# Patient Record
Sex: FEMALE | Race: WHITE
[De-identification: names, ages, dates, MRNs, and addresses within clinical notes are randomized per-mention and may not be internally consistent; named-entity substitution may affect disease eponyms.]

---

## 2021-05-25 ENCOUNTER — HOSPITAL ENCOUNTER (OUTPATIENT)
Dept: HOSPITAL 76 - LAB.WC | Age: 31
Discharge: HOME | End: 2021-05-25
Attending: ADVANCED PRACTICE MIDWIFE
Payer: COMMERCIAL

## 2021-05-25 DIAGNOSIS — Z32.01: Primary | ICD-10-CM

## 2021-05-25 LAB
AMPHET UR QL SCN: NEGATIVE
BARBITURATES UR QL SCN>300 NG/ML: NEGATIVE
BENZODIAZ UR QL SCN: NEGATIVE
CLARITY UR REFRACT.AUTO: CLEAR
COCAINE UR-SCNC: NEGATIVE UMOL/L
GLUCOSE UR QL STRIP.AUTO: NEGATIVE MG/DL
KETONES UR QL STRIP.AUTO: NEGATIVE MG/DL
METHADONE UR QL SCN: NEGATIVE
METHAMPHET UR QL SCN: NEGATIVE
NITRITE UR QL STRIP.AUTO: NEGATIVE
OPIATES UR QL SCN: NEGATIVE
PH UR STRIP.AUTO: 6 PH (ref 5–7.5)
PROT UR STRIP.AUTO-MCNC: NEGATIVE MG/DL
RBC # UR STRIP.AUTO: NEGATIVE /UL
RBC # URNS HPF: (no result) /HPF (ref 0–5)
SP GR UR STRIP.AUTO: <=1.005 (ref 1–1.03)
SQUAMOUS URNS QL MICRO: (no result)
THC UR QL SCN: NEGATIVE
UROBILINOGEN UR QL STRIP.AUTO: (no result) E.U./DL
UROBILINOGEN UR STRIP.AUTO-MCNC: NEGATIVE MG/DL
VOLATILE DRUGS POS SERPL SCN: (no result)
WBC # UR MANUAL: (no result) /HPF (ref 0–5)

## 2021-05-25 PROCEDURE — 87086 URINE CULTURE/COLONY COUNT: CPT

## 2021-05-25 PROCEDURE — 81001 URINALYSIS AUTO W/SCOPE: CPT

## 2021-05-25 PROCEDURE — 80306 DRUG TEST PRSMV INSTRMNT: CPT

## 2021-06-09 ENCOUNTER — HOSPITAL ENCOUNTER (OUTPATIENT)
Dept: HOSPITAL 76 - DI | Age: 31
Discharge: HOME | End: 2021-06-09
Attending: ADVANCED PRACTICE MIDWIFE
Payer: COMMERCIAL

## 2021-06-09 DIAGNOSIS — Z36.89: Primary | ICD-10-CM

## 2021-06-09 LAB
BASOPHILS NFR BLD AUTO: 0 10^3/UL (ref 0–0.1)
BASOPHILS NFR BLD AUTO: 0.4 %
EOSINOPHIL # BLD AUTO: 0.2 10^3/UL (ref 0–0.7)
EOSINOPHIL NFR BLD AUTO: 1.5 %
ERYTHROCYTE [DISTWIDTH] IN BLOOD BY AUTOMATED COUNT: 12.3 % (ref 12–15)
HCT VFR BLD AUTO: 36.2 % (ref 37–47)
HGB UR QL STRIP: 12.3 G/DL (ref 12–16)
LYMPHOCYTES # SPEC AUTO: 1.9 10^3/UL (ref 1.5–3.5)
LYMPHOCYTES NFR BLD AUTO: 18.9 %
MCH RBC QN AUTO: 30.4 PG (ref 27–31)
MCHC RBC AUTO-ENTMCNC: 34 G/DL (ref 32–36)
MCV RBC AUTO: 89.6 FL (ref 81–99)
MONOCYTES # BLD AUTO: 0.4 10^3/UL (ref 0–1)
MONOCYTES NFR BLD AUTO: 4.1 %
NEUTROPHILS # BLD AUTO: 7.4 10^3/UL (ref 1.5–6.6)
NEUTROPHILS # SNV AUTO: 9.9 X10^3/UL (ref 4.8–10.8)
NEUTROPHILS NFR BLD AUTO: 74.7 %
NRBC # BLD AUTO: 0 /100WBC
NRBC # BLD AUTO: 0 X10^3/UL
PDW BLD AUTO: 11.1 FL (ref 7.9–10.8)
PLATELET # BLD: 234 10^3/UL (ref 130–450)
RBC MAR: 4.04 10^6/UL (ref 4.2–5.4)

## 2021-06-09 PROCEDURE — 36415 COLL VENOUS BLD VENIPUNCTURE: CPT

## 2021-06-09 PROCEDURE — 85025 COMPLETE CBC W/AUTO DIFF WBC: CPT

## 2021-06-09 PROCEDURE — 86850 RBC ANTIBODY SCREEN: CPT

## 2021-06-09 PROCEDURE — 86592 SYPHILIS TEST NON-TREP QUAL: CPT

## 2021-06-09 PROCEDURE — 86762 RUBELLA ANTIBODY: CPT

## 2021-06-09 PROCEDURE — 86901 BLOOD TYPING SEROLOGIC RH(D): CPT

## 2021-06-09 PROCEDURE — 86787 VARICELLA-ZOSTER ANTIBODY: CPT

## 2021-06-09 PROCEDURE — 86803 HEPATITIS C AB TEST: CPT

## 2021-06-09 PROCEDURE — 87389 HIV-1 AG W/HIV-1&-2 AB AG IA: CPT

## 2021-06-09 PROCEDURE — 87340 HEPATITIS B SURFACE AG IA: CPT

## 2021-06-09 PROCEDURE — 86900 BLOOD TYPING SEROLOGIC ABO: CPT

## 2021-06-10 NOTE — ULTRASOUND REPORT
PROCEDURE:  OB First Trimester

 

INDICATIONS:  +PREG TEST

 

OUTSIDE/PRIOR DATING DATA:  

Last menstrual period (LMP): 4/4/2021.  

LMP-based estimated date of delivery (HÉCTOR): 1/9/2021.  

First dating scan (date and location): 6/9/2021.  [facility]

Estimated date of delivery (HÉCTOR) from first dating scan: 1/10/2021.  

The below data below was generated using the initial ultrasound HÉCTOR of 1/10/2021

 

TECHNIQUE:  

Real-time scanning was performed of the fetus and maternal pelvic organs, with image documentation.  


 

COMPARISON:  None

 

FINDINGS:     

 

Embryo:  A live single intrauterine pregnancy is present. Mean gestational sac diameter = 4.26 cm cor
responding with 9 weeks 6 days. Crown-rump length = 2.58 cm corresponding with 9 weeks 2 days. Compos
ite gestational age today = 9 weeks 2 days.

 

 

Measurement variability in dating:  +/- 4 weeks by LMP, +/- 7 days by mean sac diameter (use before 6
 weeks gestation if crown-rump length not able to be measured), +/- 5 days by crown-rump length (6-12
 weeks gestation).  

 

Maternal organs:  Ovaries demonstrate a right corpus luteal cyst.  

 

IMPRESSION:  Live single intrauterine pregnancy with a composite gestational age of 9 weeks 2 days.

 

Reviewed by: Eloy Hand on 6/10/2021 9:11 AM PDT

Approved by: Eloy Hand on 6/10/2021 9:11 AM PDT

 

 

Station ID:  [id number]

## 2021-06-14 ENCOUNTER — HOSPITAL ENCOUNTER (OUTPATIENT)
Dept: HOSPITAL 76 - LAB.WC | Age: 31
Discharge: HOME | End: 2021-06-14
Attending: ADVANCED PRACTICE MIDWIFE
Payer: COMMERCIAL

## 2021-06-14 DIAGNOSIS — Z11.3: Primary | ICD-10-CM

## 2021-06-14 LAB
C TRACH DNA SPEC NAA+PROBE-ACNC: NEGATIVE
HEPATITIS B SURFACE ANTIGEN: (no result)
HEPATITIS C ANTIBODY: (no result)
N GONORRHOEA DNA GENITAL QL NAA+PROBE: NEGATIVE
SIGNAL TO CUT-OFF: 0
T VAGINALIS RRNA GENITAL QL PROBE: NEGATIVE

## 2021-06-14 PROCEDURE — 87661 TRICHOMONAS VAGINALIS AMPLIF: CPT

## 2021-06-14 PROCEDURE — 87591 N.GONORRHOEAE DNA AMP PROB: CPT

## 2021-06-14 PROCEDURE — 87491 CHLMYD TRACH DNA AMP PROBE: CPT

## 2021-07-30 ENCOUNTER — HOSPITAL ENCOUNTER (OUTPATIENT)
Dept: HOSPITAL 76 - LAB | Age: 31
Discharge: HOME | End: 2021-07-30
Attending: ADVANCED PRACTICE MIDWIFE
Payer: COMMERCIAL

## 2021-07-30 DIAGNOSIS — Z36.0: ICD-10-CM

## 2021-07-30 DIAGNOSIS — Z34.90: Primary | ICD-10-CM

## 2021-07-30 PROCEDURE — 81511 FTL CGEN ABNOR FOUR ANAL: CPT

## 2021-08-03 LAB
AFP MOM: 1.9
AGE RISK DOWN SYNDROME: (no result)
CALC'D GESTATIONAL AGE: 16.7 WEEKS
CIGARETTE SMOKER?: (no result)
COMMENTS: (no result)
DONOR AGE: EGG RETRIEVAL: (no result)
DONOR EGG: NO
EDD DETERMINED BY: (no result)
ESTRIOL MOM: 0.85
HCG MOM: 0.96
HX OF NEURAL TUBE DEFECTS: NO
INHIBIN A MOM: 0.98
INSULIN DEPEND DIABETIC: NO
INTERPRETATION: (no result)
MATERNAL WEIGHT: 101 LBS
MSS DOWN SYNDROME RISK: (no result)
MSS3 TRISOMY 18 RISK: (no result)
NUMBER OF FETUSES: 1
PREV PREGNANCY DOWN SYND: NO
RISK FOR ONTD: (no result)

## 2021-08-22 ENCOUNTER — HOSPITAL ENCOUNTER (OUTPATIENT)
Dept: HOSPITAL 76 - DI | Age: 31
Discharge: HOME | End: 2021-08-22
Attending: ADVANCED PRACTICE MIDWIFE
Payer: COMMERCIAL

## 2021-08-22 DIAGNOSIS — Z36.89: ICD-10-CM

## 2021-08-22 DIAGNOSIS — Z34.02: Primary | ICD-10-CM

## 2021-08-22 NOTE — ULTRASOUND REPORT
PROCEDURE:  OB Detailed Fetal Eval

 

INDICATIONS:  SUPERVISION OF NORMAL PREGNANCY

 

OUTSIDE/PRIOR DATING DATA:  

Last menstrual period (LMP): 4/4/2021.  

LMP-based estimated date of delivery (HÉCTOR): 1/9/2022.  

First dating scan (date and location): 6/9/2021.  

Estimated date of delivery (HÉCTOR) from first dating scan: 1/10/2022.  

The below data below was generated using the ultrasound HÉCTOR of 1/10/2022

 

TECHNIQUE: 

Real-time scanning was performed of the fetus, with image documentation and biometric measurements.  


 

COMPARISON:  6/9/2021

 

FINDINGS: 

 

General:  A single live intrauterine gestation is present.  

Presentation:  Variable

Placenta:  Placental position is posterior, without previa.  

Amniotic fluid index:  19.2 cm,  within normal limits for gestational age.  

Fetal heart rate:  160 beats per minute.  

Maternal cervical canal:  4.2 cm long; normal length is 2.5 cm or more.  

 

Fetal biometrics:  

Biparietal diameter:  4.6 cm equals 20 weeks 0 days

Head circumference:  17 cm equals 19 weeks 4 days

Abdominal circumference:  14.6 cm equals 19 weeks 6 days

Femur length:  2.8 cm equals 18 weeks 4 days

Estimated gestational age from initial scan: 19 weeks 6 days.  

Composite gestational age from present scan:  19 weeks 3 days Estimated fetal weight and percentile: 
 287 g, 20th percentile

 

Anatomic survey:  

Neuro:  Ventricles are normal at less than 10 mm.  Cisterna magna is normal at 3-11 mm.  Cerebellum i
s normal in size and morphology.  

Nuchal skin fold:  Normal at less than 6 mm between 14 and 20 weeks gestational age.  

Face:  Nose and lips, facial profile are normal.  

Spine:  No evidence for spina bifida.  

Heart:  4-chambered heart is present, with normal ventricular outflow tracts.  

Diaphragm:  Diaphragm is intact.  

Stomach:  Left-sided stomach is present.  

Kidneys:  No fetal hydronephrosis.  Normal is less than 5 mm in 2nd trimester, less than 7 mm in 3rd 
trimester.  

Cord:  3 vessel cord has orthotopic insertion.  

Bladder:  Normal in size.  

Extremities:  All 4 extremities are visualized.  

 

An apparent right ovarian corpus luteum can be seen and measures up to 1.3 cm.

 

 

 

IMPRESSION:  

 

Single live intrauterine pregnancy.  

 

Normal interval growth compared to the prior ultrasound examination.  

 

No anatomic abnormalities are identified. 

 

Reviewed by: Kwabena Key MD on 8/22/2021 9:31 AM OLIVE

Approved by: Kwabena Key MD on 8/22/2021 9:31 AM OLIVE

 

 

Station ID:  [id number]

## 2021-10-12 ENCOUNTER — HOSPITAL ENCOUNTER (OUTPATIENT)
Dept: HOSPITAL 76 - LAB | Age: 31
Discharge: HOME | End: 2021-10-12
Attending: ADVANCED PRACTICE MIDWIFE
Payer: COMMERCIAL

## 2021-10-12 DIAGNOSIS — R30.0: Primary | ICD-10-CM

## 2021-10-12 PROCEDURE — 87086 URINE CULTURE/COLONY COUNT: CPT

## 2021-10-12 PROCEDURE — 81001 URINALYSIS AUTO W/SCOPE: CPT

## 2021-10-13 LAB
CLARITY UR REFRACT.AUTO: CLEAR
GLUCOSE UR QL STRIP.AUTO: NEGATIVE MG/DL
KETONES UR QL STRIP.AUTO: NEGATIVE MG/DL
NITRITE UR QL STRIP.AUTO: NEGATIVE
PH UR STRIP.AUTO: 7 PH (ref 5–7.5)
PROT UR STRIP.AUTO-MCNC: NEGATIVE MG/DL
RBC # UR STRIP.AUTO: NEGATIVE /UL
SP GR UR STRIP.AUTO: 1.01 (ref 1–1.03)
SQUAMOUS URNS QL MICRO: (no result)
UROBILINOGEN UR QL STRIP.AUTO: (no result) E.U./DL
UROBILINOGEN UR STRIP.AUTO-MCNC: NEGATIVE MG/DL
WBC # UR MANUAL: (no result) /HPF (ref 0–5)

## 2021-10-20 ENCOUNTER — HOSPITAL ENCOUNTER (OUTPATIENT)
Dept: HOSPITAL 76 - LAB | Age: 31
Discharge: HOME | End: 2021-10-20
Attending: ADVANCED PRACTICE MIDWIFE
Payer: COMMERCIAL

## 2021-10-20 DIAGNOSIS — Z36.89: ICD-10-CM

## 2021-10-20 DIAGNOSIS — Z34.90: Primary | ICD-10-CM

## 2021-10-20 LAB
ERYTHROCYTE [DISTWIDTH] IN BLOOD BY AUTOMATED COUNT: 13.1 % (ref 12–15)
HCT VFR BLD AUTO: 34.7 % (ref 37–47)
HGB UR QL STRIP: 11.5 G/DL (ref 12–16)
MCH RBC QN AUTO: 31.2 PG (ref 27–31)
MCHC RBC AUTO-ENTMCNC: 33.1 G/DL (ref 32–36)
MCV RBC AUTO: 94 FL (ref 81–99)
NEUTROPHILS # SNV AUTO: 10.1 X10^3/UL (ref 4.8–10.8)
PDW BLD AUTO: 11.4 FL (ref 7.9–10.8)
PLATELET # BLD: 190 10^3/UL (ref 130–450)
RBC MAR: 3.69 10^6/UL (ref 4.2–5.4)

## 2021-10-20 PROCEDURE — 36415 COLL VENOUS BLD VENIPUNCTURE: CPT

## 2021-10-20 PROCEDURE — 85027 COMPLETE CBC AUTOMATED: CPT

## 2021-10-20 PROCEDURE — 82950 GLUCOSE TEST: CPT

## 2021-11-09 ENCOUNTER — HOSPITAL ENCOUNTER (OUTPATIENT)
Dept: HOSPITAL 76 - WFO | Age: 31
Discharge: HOME | End: 2021-11-09
Attending: OBSTETRICS & GYNECOLOGY
Payer: COMMERCIAL

## 2021-11-09 ENCOUNTER — HOSPITAL ENCOUNTER (OUTPATIENT)
Dept: HOSPITAL 76 - LAB.WC | Age: 31
Discharge: HOME | End: 2021-11-09
Attending: ADVANCED PRACTICE MIDWIFE
Payer: COMMERCIAL

## 2021-11-09 VITALS — DIASTOLIC BLOOD PRESSURE: 76 MMHG | SYSTOLIC BLOOD PRESSURE: 112 MMHG

## 2021-11-09 DIAGNOSIS — O43.893: Primary | ICD-10-CM

## 2021-11-09 DIAGNOSIS — Z3A.31: ICD-10-CM

## 2021-11-09 DIAGNOSIS — N89.8: ICD-10-CM

## 2021-11-09 DIAGNOSIS — R10.2: ICD-10-CM

## 2021-11-09 DIAGNOSIS — O99.891: ICD-10-CM

## 2021-11-09 DIAGNOSIS — O99.891: Primary | ICD-10-CM

## 2021-11-09 LAB
BASOPHILS NFR BLD AUTO: 0 10^3/UL (ref 0–0.1)
BASOPHILS NFR BLD AUTO: 0.2 %
C TRACH DNA SPEC NAA+PROBE-ACNC: NEGATIVE
CLARITY UR REFRACT.AUTO: CLEAR
EOSINOPHIL # BLD AUTO: 0.2 10^3/UL (ref 0–0.7)
EOSINOPHIL NFR BLD AUTO: 2 %
ERYTHROCYTE [DISTWIDTH] IN BLOOD BY AUTOMATED COUNT: 13.4 % (ref 12–15)
GLUCOSE UR QL STRIP.AUTO: NEGATIVE MG/DL
HCT VFR BLD AUTO: 35.7 % (ref 37–47)
HGB UR QL STRIP: 11.7 G/DL (ref 12–16)
IGF BP1 VAG QL: NEGATIVE
KETONES UR QL STRIP.AUTO: 40 MG/DL
LYMPHOCYTES # SPEC AUTO: 2.1 10^3/UL (ref 1.5–3.5)
LYMPHOCYTES NFR BLD AUTO: 16.9 %
MCH RBC QN AUTO: 30.7 PG (ref 27–31)
MCHC RBC AUTO-ENTMCNC: 32.8 G/DL (ref 32–36)
MCV RBC AUTO: 93.7 FL (ref 81–99)
MONOCYTES # BLD AUTO: 0.6 10^3/UL (ref 0–1)
MONOCYTES NFR BLD AUTO: 4.6 %
N GONORRHOEA DNA GENITAL QL NAA+PROBE: NEGATIVE
NEUTROPHILS # BLD AUTO: 9.2 10^3/UL (ref 1.5–6.6)
NEUTROPHILS # SNV AUTO: 12.1 X10^3/UL (ref 4.8–10.8)
NEUTROPHILS NFR BLD AUTO: 76.1 %
NITRITE UR QL STRIP.AUTO: NEGATIVE
NRBC # BLD AUTO: 0 /100WBC
NRBC # BLD AUTO: 0 X10^3/UL
PDW BLD AUTO: 11.9 FL (ref 7.9–10.8)
PH UR STRIP.AUTO: 6 PH (ref 5–7.5)
PLATELET # BLD: 172 10^3/UL (ref 130–450)
PROT UR STRIP.AUTO-MCNC: NEGATIVE MG/DL
RBC # UR STRIP.AUTO: (no result) /UL
RBC # URNS HPF: (no result) /HPF (ref 0–5)
RBC MAR: 3.81 10^6/UL (ref 4.2–5.4)
SP GR UR STRIP.AUTO: 1.02 (ref 1–1.03)
SQUAMOUS URNS QL MICRO: (no result)
T VAGINALIS RRNA GENITAL QL PROBE: NEGATIVE
UROBILINOGEN UR QL STRIP.AUTO: (no result) E.U./DL
UROBILINOGEN UR STRIP.AUTO-MCNC: NEGATIVE MG/DL
WBC # UR MANUAL: (no result) /HPF (ref 0–5)

## 2021-11-09 PROCEDURE — 87591 N.GONORRHOEAE DNA AMP PROB: CPT

## 2021-11-09 PROCEDURE — 87081 CULTURE SCREEN ONLY: CPT

## 2021-11-09 PROCEDURE — 85025 COMPLETE CBC W/AUTO DIFF WBC: CPT

## 2021-11-09 PROCEDURE — 99214 OFFICE O/P EST MOD 30 MIN: CPT

## 2021-11-09 PROCEDURE — 87661 TRICHOMONAS VAGINALIS AMPLIF: CPT

## 2021-11-09 PROCEDURE — 84112 EVAL AMNIOTIC FLUID PROTEIN: CPT

## 2021-11-09 PROCEDURE — 59025 FETAL NON-STRESS TEST: CPT

## 2021-11-09 PROCEDURE — 96372 THER/PROPH/DIAG INJ SC/IM: CPT

## 2021-11-09 PROCEDURE — 87491 CHLMYD TRACH DNA AMP PROBE: CPT

## 2021-11-09 PROCEDURE — 76815 OB US LIMITED FETUS(S): CPT

## 2021-11-09 PROCEDURE — 36415 COLL VENOUS BLD VENIPUNCTURE: CPT

## 2021-11-09 PROCEDURE — 87210 SMEAR WET MOUNT SALINE/INK: CPT

## 2021-11-09 PROCEDURE — 87086 URINE CULTURE/COLONY COUNT: CPT

## 2021-11-09 PROCEDURE — 81001 URINALYSIS AUTO W/SCOPE: CPT

## 2021-11-09 PROCEDURE — 96360 HYDRATION IV INFUSION INIT: CPT

## 2021-11-09 NOTE — PROVIDER PROGRESS NOTE
- HPI


Chief Complaint: Vaginal bleeding (She presents with complaints of vaginal 

bleeding which started approximately 1 hour prior to presentation. She reports 

she was holding her child when she noticed blood similar to a "light period". 

She denies contractions. She denies leakage of fluid. She has not had vaginal 

bleeding prior previously)


Current Pregnancy: 


                                                               Current Pregnancy





EDU                              22


Gestation                        31 Weeks and 2 Days


                          3


Para                             2





Vital Signs











Temperature  98.8 F   21 18:12


 


Heart Rate  80   21 18:12


 


Respiratory Rate  16   21 18:12


 


Blood Pressure  110/76   21 18:12




















Temperature  98.8 F   21 18:20


 


Heart Rate  80   21 18:20


 


Respiratory Rate  16   21 18:20


 


Blood Pressure  110/76   21 18:20


 


O2 Saturation  100   21 18:20














- Exam





Generalno acute distress


Abdomen soft nontender gravid


Sterile speculum exam. Cervix visibly closed. Yellowish pink-tinged mucus-like 

discharge noted from the cervical os.





- Procedures


OB Procedure Performed: NST


NST Procedure: 


NST Procedure





Start Date                       21


Start Time                       18:08


Vibroacoustic Stimulation Used   No


Patient States Fetal Movement    Yes








Service Date of procedure: 21


Findings: 





Fetal baseline 150 bpm, moderate variability, accelerations 15 x 15, no 

decelerations contractions noted every 3 to 5 minutes.





- Plan


Plan: 


Past medical historynone


Past surgical historynone





31-year-old G3, P2 at 31 weeks 2 days who presents with complaints of vaginal 

bleeding


1.  Vaginal bleeding.  Start speculum exam with yellowish mucus-like pink-tinged

discharge.  Consistent with infectious process.  Wet prep and gonorrhea 

chlamydia swab sent.  No active bleeding noted.  Cervix visibly closed.  Ul

trasound ordered. O+


2.   contractions.  Cervix visibly closed.  Transvaginal cervical length 

ordered.  Will start BMZ series. I/V fluids ordered.  CBC. We will continue to 

monitor.











Addendum: Ulltrasound showed placental lakes as well as a area of soft tissue in

the anterior aspect of the endometrium.  MFM was consulted.  [facility]  She recommends the following:

 Discharge with pelvic rest, modified bedrest and ER precautions.  Patient was 

given betamethasone.  Recommends completion of series.  Will have patient return

tomorrow for second betamethasone shot.  Gonorrhea and committee test sent and 

pending.  Wet prep consistent with bacterial vaginosis Flagyl Rx sent.

## 2021-11-09 NOTE — ULTRASOUND REPORT
PROCEDURE:  OB Limited

 

INDICATIONS:  vaginal bleeding

 

OUTSIDE/PRIOR DATING DATA:  

Last menstrual period (LMP): 4/4/2021.  

LMP-based estimated date of delivery (HÉCTOR): 1/9/2021.  

First dating scan (date and location): 6/9/2021.  

Estimated date of delivery (HÉCTOR) from first dating scan: 1/10/2022.  

The below data below was generated using the ultrasound derived HÉCTOR of 1/10/2022.

 

TECHNIQUE: 

Real-time scanning was performed of the fetus, with image documentation.  

 

COMPARISON:  8/22/2021, 6/9/2021.

 

FINDINGS:  

A single living intrauterine gestation is present.  

Presentation: Vertex

Placenta: Placental position is posterior, without previa.  

Amniotic fluid index: 18.5 cm, within normal limits for gestational age. Largest pocket measures 6.2 
cm. 

Fetal heart rate: 152 beats per minutes.  

Maternal cervical canal: 4.1 cm long; normal length is 2.5 cm or more.  

Estimated gestational age from initial scan: 31 weeks 1 day.  

 

There are a few hypoechoic collections within the placenta consistent with venous lakes. No definite 
periplacental fluid collections to suggest abruption.

 

Along the ventral uterine wall, there are 2 adjacent lobulated masslike lesions contiguous with the m
yometrium indenting on the gestational sac. These measure up to 3.1 x 2.2 x 3.2 cm and 3.8 x 1.9 x 5.
6 cm. These are heterogeneous in appearance with internal hypoechoic regions.

 

IMPRESSION:  

 

1. Single living intrauterine pregnancy demonstrated in vertex position.

 

2. Cervix appears closed.

 

3. Amniotic fluid index within normal limits.

 

4. No periplacental fluid collections to suggest abruption.

 

5. Lobulated masslike lesions along the positional sac anteriorly contiguous with the myometrium. The
 findings are nonspecific and the differential includes uterine fibroids or sequelae of a prior gesta
tional hemorrhage among other etiologies. Recommend short-term follow-up ultrasound to demonstrate st
ability.

 

Reviewed by: Alex Roque MD on 11/9/2021 10:12 PM PST

Approved by: Alex Roque MD on 11/9/2021 10:12 PM PST

 

 

Station ID:  [id number]

## 2021-11-10 ENCOUNTER — HOSPITAL ENCOUNTER (OUTPATIENT)
Dept: HOSPITAL 76 - FBP | Age: 31
Discharge: HOME | End: 2021-11-10
Attending: OBSTETRICS & GYNECOLOGY
Payer: COMMERCIAL

## 2021-11-10 VITALS — SYSTOLIC BLOOD PRESSURE: 116 MMHG | DIASTOLIC BLOOD PRESSURE: 72 MMHG

## 2021-11-10 DIAGNOSIS — Z3A.31: ICD-10-CM

## 2021-11-10 DIAGNOSIS — O46.93: Primary | ICD-10-CM

## 2021-11-10 DIAGNOSIS — O43.893: ICD-10-CM

## 2021-11-10 DIAGNOSIS — O23.593: ICD-10-CM

## 2021-11-10 DIAGNOSIS — B96.89: ICD-10-CM

## 2021-11-10 PROCEDURE — 99213 OFFICE O/P EST LOW 20 MIN: CPT

## 2021-11-10 PROCEDURE — 59025 FETAL NON-STRESS TEST: CPT

## 2021-11-10 PROCEDURE — 96372 THER/PROPH/DIAG INJ SC/IM: CPT

## 2021-11-10 NOTE — PROVIDER PROGRESS NOTE
- HPI


Chief Complaint: Other (Patient presents for follow-up of her vaginal bleeding. 

She reports her bleeding has now essentially resolved with just brownish 

discharge.  She denies contractions or leakage of fluid.  She notes positive 

fetal movement.  She does report a headache earlier today which resolved with 

Tylenol.)


Current Pregnancy: 


31 weeks 3 days





- Procedures


OB Procedure Performed: NST


Diagnosis/Indication for NST: Other (Vaginal bleeding in pregnancy)


NST Procedure: 


NST Procedure





Start Time                       2024


Stop Time                        2046





Fetal baseline 140, accelerations 10x10 , no decelerations, rare contractions( 1

in 20 mins) NST reactive and reassuring.











Service Date of procedure: 11/10/21





- Plan


Plan: 





31-year-old G3, P2 at 31 weeks 2 days who presents for follow-up of vaginal 

bleeding.





1.  Vaginal bleeding- resolved.  Gonorrhea and Chlamydia were negative. On 

Flagyl for BV. Betamethasone No. 2 given today. 





Of note - Ulltrasound showed placental lakes as well as a area of soft tissue in

the anterior aspect of the endometrium.  MFM was consulted.  Discussed with Dr. Dolly Malik with [facility]  She recommends the following:

 Discharge with pelvic rest, modified bedrest and ER precautions.  Patient was 

given betamethasone.  Recommends completion of series.

## 2021-11-23 ENCOUNTER — HOSPITAL ENCOUNTER (OUTPATIENT)
Dept: HOSPITAL 76 - DI | Age: 31
Discharge: HOME | End: 2021-11-23
Attending: ADVANCED PRACTICE MIDWIFE
Payer: COMMERCIAL

## 2021-11-23 DIAGNOSIS — O46.93: Primary | ICD-10-CM

## 2021-11-23 DIAGNOSIS — Z3A.30: ICD-10-CM

## 2021-11-23 NOTE — ULTRASOUND REPORT
PROCEDURE:  OB F/U or Repeat

 

INDICATIONS:  THIRD TRIMESTER VAGINAL BLEEDING

 

OUTSIDE/PRIOR DATING DATA:  

Last menstrual period (LMP): 4/4/2021.  

LMP-based estimated date of delivery (HÉCTOR): 1/9/2021.  

First dating scan (date and location): 6/9/2021.  

Estimated date of delivery (HÉCTOR) from first dating scan: 1/10/2022.  

 

 

TECHNIQUE: 

Real-time scanning was performed of the fetus, with image documentation and biometric measurements.  


Endovaginal scanning:  No

 

COMPARISON:  None.

 

FINDINGS:  

 

General:  A single living intrauterine gestation is present.  

Presentation:  Vertex

Placenta:  Placental position is posterior, without previa.  

Amniotic fluid index:  18.9 cm

Fetal heart rate:  143 beats per minute.  

Maternal cervical canal:  3 cm long; normal length is 2.5 cm or more.  No change in nonspecific soft 
tissue lobulated focus within the anterior uterine wall measuring 6.8 x 3.6 x 4.1 cm.

 

Fetal biometrics:  

Biparietal diameter:  74 mm; 29 weeks 5 days

Head circumference:  27 mm; 31 weeks 4 days

Abdominal circumference:  279 mm; 32 weeks 0 days

Femur length:  57 mm; 30 weeks 2 days

Estimated gestational age from initial scan: 33 weeks 1 day

Composite gestational age from present scan:  30 weeks 6 days

Estimated fetal weight and percentile: 1723 g, which is at the 5th percentile for gestational age

Measurement variability in biometric dating: +/- 10 days from 12-20 weeks gestation, +/- 2 weeks from
 20-30 weeks gestation, +/- 3 weeks at 30 weeks gestation or more.  

 

Other: Survey of fetal anatomy currently includes normal chest/diaphragm, stomach/abdomen, bilateral 
renal regions, and urinary bladder/pelvis.

 

IMPRESSION:  

1. Single living intrauterine gestation. 

2. Estimated fetal weight is at the 5th percentile for gestational age.

3. No change in anterior uterine focus.

 

Reviewed by: Burke Chowdary MD on 11/23/2021 4:57 PM PST

Approved by: Burke Chowdary MD on 11/23/2021 4:57 PM PST

 

 

Station ID:  [id number]

## 2021-12-07 ENCOUNTER — HOSPITAL ENCOUNTER (OUTPATIENT)
Dept: HOSPITAL 76 - WFO | Age: 31
Discharge: HOME | End: 2021-12-07
Attending: ADVANCED PRACTICE MIDWIFE
Payer: COMMERCIAL

## 2021-12-07 VITALS — DIASTOLIC BLOOD PRESSURE: 80 MMHG | SYSTOLIC BLOOD PRESSURE: 124 MMHG

## 2021-12-07 DIAGNOSIS — Z87.59: ICD-10-CM

## 2021-12-07 DIAGNOSIS — Z3A.35: ICD-10-CM

## 2021-12-07 DIAGNOSIS — Z79.82: ICD-10-CM

## 2021-12-07 DIAGNOSIS — O36.5930: Primary | ICD-10-CM

## 2021-12-07 PROCEDURE — 59025 FETAL NON-STRESS TEST: CPT

## 2021-12-07 PROCEDURE — 99214 OFFICE O/P EST MOD 30 MIN: CPT

## 2021-12-07 NOTE — ULTRASOUND REPORT
PROCEDURE:  OB Fetal Biophysical Profile

 

INDICATIONS:  Small for gestational age fetus

 

OUTSIDE/PRIOR DATING DATA:  

Last menstrual period (LMP): 4/4/2021.  

LMP-based estimated date of delivery (HÉCTOR): 1/9/2021.  

First dating scan (date and location): 6/9/2021.  

Estimated date of delivery (HÉCTOR) from first dating scan: 1/10/2022.  

The below data below was generated using the study generated HÉCTOR of 1/10/2022

 

TECHNIQUE:  Real-time scanning was performed of the fetus, with image documentation and biometric kristen
surements.  Biophysical profile was also obtained.  

 

 

COMPARISON:  11/9/2021, 11/23/2021.

 

FINDINGS:  

 

General:  A single living intrauterine gestation is present.  

Presentation:  Vertex

Placenta:  Placental position is posterior, without previa.  

Amniotic fluid index:  17.6 cm, normal for gestational age.  

Fetal heart rate:  126 beats per minute. 

Maternal cervical canal:  3.54 cm long; normal length is 2.5 cm or more.  

Estimated gestational age from initial scan: 35 weeks, 1 day

 

Biophysical profile:  

Tone: 2 points.

Movement:  2 points.  

Respiration:  0 points.  

Largest pocket of fluid:  2 points.  

 

IMPRESSION:  

 

1. Single live anterior pregnancy with fetus in vertex presentation. Fetal heart rate is 126 bpm. Nor
mal amount of amniotic fluid.

2. Fetal biophysical profile score measures 6 out of 8 with no convincing fetal respiration is seen d
uring the study.

 

Reviewed by: Jose Travis MD on 12/7/2021 6:05 PM PST

Approved by: Jose Travis MD on 12/7/2021 6:05 PM PST

 

 

Station ID:  [id number]

## 2021-12-07 NOTE — PROCEDURE REPORT
- HPI


Diagnosis/Indication for NST: Intrauterine growth restriction


Vital Signs











Temperature  36.9 C   21 15:04


 


Heart Rate  66   21 15:04


 


Respiratory Rate  16   21 15:04


 


Blood Pressure  124/80   21 15:04




















Temperature  36.9 C   21 15:04


 


Heart Rate  66   21 15:04


 


Respiratory Rate  16   21 15:04


 


Blood Pressure  124/80   21 15:04


 


O2 Saturation      














- NST Procedure


NST Procedure





Start Time                       20:23


Stop Time                        20:45











- Results and Plan


Findings/Impression: 





Nora presents to [facility] for NST and BPP with doppler and CACHORRO secondary to 

suspected IUGR on last ultrasound with EFW measuring 5%tile. Her MFM consult has

been initiated however she has not been able to schedule with them yet due to in

surance barriers. Working with clinic RN to expedite this referral.





NST performed 2021


NST read 2021


NST reactive. FHR baseline 140, moderate variability, + accels, no decels


No contractions appreciated via tocometry





BPP  for absence of fetal respirations.


CACHORRO WNL @ 17.1





Cord Dopplers WNL.





Assessment:


32yo  @ 35.2wks gestation by LMP c/w 9.2wk U/S


Suspected IUGR


BPP  --8/10 (with NST)


Hx preeclampsia - normotensive now and taking ASA daily.





Plan:


Pt released home with precautions. 


Will return this week for repeat NST and BPP.


Will initiate twice weekly NSTs and BPP with CACHORRO and cord dopplers and await 

further recommendations from MFM for surveillance and plan of care for timing of

delivery. 


Pt verbalized understanding and agrees to above plan. She denies further 

questions or concerns at this time.





FINAL DIAGNOSIS:


Intrauterine growth restriction

## 2021-12-14 ENCOUNTER — HOSPITAL ENCOUNTER (OUTPATIENT)
Dept: HOSPITAL 76 - DI | Age: 31
Discharge: HOME | End: 2021-12-14
Attending: ADVANCED PRACTICE MIDWIFE
Payer: COMMERCIAL

## 2021-12-14 ENCOUNTER — HOSPITAL ENCOUNTER (OUTPATIENT)
Dept: HOSPITAL 76 - WFO | Age: 31
Discharge: HOME | End: 2021-12-14
Attending: OBSTETRICS & GYNECOLOGY
Payer: COMMERCIAL

## 2021-12-14 ENCOUNTER — HOSPITAL ENCOUNTER (OUTPATIENT)
Dept: HOSPITAL 76 - LAB | Age: 31
Discharge: HOME | End: 2021-12-14
Attending: ADVANCED PRACTICE MIDWIFE
Payer: COMMERCIAL

## 2021-12-14 VITALS — DIASTOLIC BLOOD PRESSURE: 79 MMHG | SYSTOLIC BLOOD PRESSURE: 118 MMHG

## 2021-12-14 DIAGNOSIS — Z3A.36: ICD-10-CM

## 2021-12-14 DIAGNOSIS — O36.5990: Primary | ICD-10-CM

## 2021-12-14 DIAGNOSIS — O36.5930: Primary | ICD-10-CM

## 2021-12-14 DIAGNOSIS — Z3A.00: ICD-10-CM

## 2021-12-14 DIAGNOSIS — Z36.85: ICD-10-CM

## 2021-12-14 DIAGNOSIS — Z36.85: Primary | ICD-10-CM

## 2021-12-14 PROCEDURE — 59025 FETAL NON-STRESS TEST: CPT

## 2021-12-14 PROCEDURE — 87797 DETECT AGENT NOS DNA DIR: CPT

## 2021-12-14 NOTE — ULTRASOUND REPORT
PROCEDURE:  OB Fetal Biophysical Profile

 

INDICATIONS:  FETUS SMALL FOR GESTATIONAL AGE

 

OUTSIDE/PRIOR DATING DATA:  

Last menstrual period (LMP): 4/4/2021.  

LMP-based estimated date of delivery (HÉCTOR): 1/9/2021.  

First dating scan (date and location): 6/9/2021.  

Estimated date of delivery (HÉCTOR) from first dating scan: 1/10/2021.  

The below data below was generated using the ultrasound generated HÉCTOR of 1/10/2021

 

TECHNIQUE:  Real-time scanning was performed of the fetus, with image documentation and biometric kristen
surements.  Biophysical profile was also obtained.  

Endovaginal scanning:  Not performed

 

COMPARISON:  Prior sonographic exams, most recent 12/7/2021

 

FINDINGS:  

 

General:  A single living intrauterine gestation is present.  

Presentation:  Vertex

Placenta:  Placental position is posterior, without previa.  

Amniotic fluid index:  20.6 cm.

Fetal heart rate:  135 beats per minute. 

Maternal cervical canal:  3.6 cm long; normal length is 2.5 cm or more.  

 

Biophysical profile:  

Tone:  2 points.

Movement:  2 points.  

Respiration:  2 points.  

Largest pocket of fluid:  2 points.  Largest pocket is 6.5 cm.

 

Cord Doppler systolic/diastolic ratios: 2.7 near the placenta, 3.1 and the mid segment, and 2.6 at th
e fetal umbilicus.

 

 

IMPRESSION:  

Single live intrauterine gestation.

Estimated date of delivery is 1/10/2021.

Fetal heart rate 1 35 bpm.

Amniotic fluid index 20.6 cm.

Biophysical profile 8/8.

Normal cord systolic/diastolic ratios.

 

Reviewed by: Meek Gauthier MD on 12/14/2021 4:53 PM PST

Approved by: Meek Gauthier MD on 12/14/2021 4:53 PM PST

 

 

Station ID:  [id number]

## 2021-12-14 NOTE — PROCEDURE REPORT
- HPI


Diagnosis/Indication for NST: Other (IUGR)


                                                               Current Pregnancy





EDU                              22


Gestation                        36 Weeks and 2 Days


                          3


Para                             2





Vital Signs











Temperature  97.7 F   21 14:35


 


Heart Rate  83   21 14:35


 


Respiratory Rate  17   21 14:35


 


Blood Pressure  118/79   21 14:35


 


O2 Saturation  100   21 14:35




















Temperature  97.7 F   21 14:35


 


Heart Rate  83   21 14:35


 


Respiratory Rate  17   21 14:35


 


Blood Pressure  118/79   21 14:35


 


O2 Saturation  100   21 14:35














- NST Procedure


NST Procedure





Start Date                       21


Start Time                       14:31


Stop Time                        15:32


Vibroacoustic Stimulation Used   No


Patient States Fetal Movement    Yes











- Results and Plan


Findings/Impression: 





Fetal heart rate baseline-140 beats per minutes


Moderate variability


Accelerations- 15x15


Decelerations none


Contractions - rare


NST reactive and reassuring


 BBP

## 2021-12-21 ENCOUNTER — HOSPITAL ENCOUNTER (OUTPATIENT)
Dept: HOSPITAL 76 - WFO | Age: 31
Discharge: HOME | End: 2021-12-21
Attending: STUDENT IN AN ORGANIZED HEALTH CARE EDUCATION/TRAINING PROGRAM
Payer: COMMERCIAL

## 2021-12-21 ENCOUNTER — HOSPITAL ENCOUNTER (OUTPATIENT)
Dept: HOSPITAL 76 - DI | Age: 31
Discharge: HOME | End: 2021-12-21
Attending: ADVANCED PRACTICE MIDWIFE
Payer: COMMERCIAL

## 2021-12-21 VITALS — SYSTOLIC BLOOD PRESSURE: 115 MMHG | DIASTOLIC BLOOD PRESSURE: 80 MMHG

## 2021-12-21 DIAGNOSIS — Z3A.37: ICD-10-CM

## 2021-12-21 DIAGNOSIS — O36.5930: Primary | ICD-10-CM

## 2021-12-21 PROCEDURE — 59025 FETAL NON-STRESS TEST: CPT

## 2021-12-21 NOTE — PROCEDURE REPORT
- HPI


Diagnosis/Indication for NST: Intrauterine growth restriction


                                                               Current Pregnancy





EDU                              22


Gestation                        37 Weeks and 2 Days


                          3


Para                             2





Vital Signs











Temperature  98.4 F   21 14:26


 


Heart Rate  86   21 14:26


 


Respiratory Rate  16   21 14:26


 


Blood Pressure  118/86 H  21 14:26




















Temperature  98.4 F   21 14:32


 


Heart Rate  78   21 14:32


 


Respiratory Rate  16   21 14:32


 


Blood Pressure  115/80   21 14:33


 


O2 Saturation      














- NST Procedure


NST Procedure





Start Date                       21


Start Time                       14:22


Stop Time                        15:01


Vibroacoustic Stimulation Used   No


Patient States Fetal Movement    Yes











- Results and Plan


Findings/Impression: 





Patient is a  at 37 weeks 2 days gestation here for scheduled NST.





NST Performed 2021


NST Read 2021





FHT: 135 beats per baseline, moderate variability, accelerations present, no 

decelerations.


[place]: 8 minutes.





Diagnosis


37 weeks gestation


IUGR


Reactive NST

## 2021-12-21 NOTE — ULTRASOUND REPORT
PROCEDURE:  OB Fetal Biophysical Profile

 

INDICATIONS:  FETUS SMALL FOR GESTATIONAL AGE

 

OUTSIDE/PRIOR DATING DATA:  

Last menstrual period (LMP): 4/4/2021.  

LMP-based estimated date of delivery (HÉCTOR): 1/9/2022.  

First dating scan (date and location): 6/9/2021.  

Estimated date of delivery (HÉCTOR) from first dating scan: 1/10/2022.  

The below data below was generated using the ultrasound generated HÉCTOR of 1/10/2022

 

TECHNIQUE:  Real-time scanning was performed of the fetus, with image documentation and biometric kristen
surements.  Biophysical profile was also obtained.  

Endovaginal scanning:  Not performed

 

COMPARISON:  12/14/2021, 12/7/2021, 11/23/2021

 

FINDINGS:  

 

General:  A single living intrauterine gestation is present.  

Presentation:  Vertex

Placenta:  Placental position is posterior, without previa.  

Amniotic fluid index:  15.8 cm, normal for gestational age.  

Fetal heart rate:  131 beats per minute. 

 

Estimated gestational age from initial scan: 37 weeks 1 day.  

 

Biophysical profile:  

Tone:  2 points.

Movement:  2 points.  

Respiration:  0 points.  

Largest pocket of fluid:  2 points (4.6 cm).  

 

Umbilical artery Doppler:  1.6 at the placenta (not well seen due to posterior placental position), 3
.1 at the mid segment, 3.8 at the fetal umbilicus.

 

IMPRESSION:  

Single live intrauterine gestation.

 

Normal amniotic fluid index.

 

Biophysical profile score 6/8 with no convincing respirations identified by the sonographer.

 

Umbilical artery Doppler ratios as described above.

 

Reviewed by: Meek Gauthier MD on 12/21/2021 9:00 PM PST

Approved by: Meek Gauthier MD on 12/21/2021 9:00 PM PST

 

 

Station ID:  [id number]

## 2021-12-22 ENCOUNTER — HOSPITAL ENCOUNTER (OUTPATIENT)
Dept: HOSPITAL 76 - EMS | Age: 31
Discharge: ANOTHER HOSPITAL | End: 2021-12-22
Payer: COMMERCIAL

## 2021-12-22 ENCOUNTER — HOSPITAL ENCOUNTER (INPATIENT)
Dept: HOSPITAL 76 - WFO | Age: 31
LOS: 2 days | Discharge: HOME | End: 2021-12-24
Attending: STUDENT IN AN ORGANIZED HEALTH CARE EDUCATION/TRAINING PROGRAM | Admitting: STUDENT IN AN ORGANIZED HEALTH CARE EDUCATION/TRAINING PROGRAM
Payer: COMMERCIAL

## 2021-12-22 DIAGNOSIS — Z3A.37: ICD-10-CM

## 2021-12-22 DIAGNOSIS — O36.5930: ICD-10-CM

## 2021-12-22 DIAGNOSIS — F41.9: ICD-10-CM

## 2021-12-22 DIAGNOSIS — Z34.80: Primary | ICD-10-CM

## 2021-12-22 LAB
BASOPHILS NFR BLD AUTO: 0.1 10^3/UL (ref 0–0.1)
BASOPHILS NFR BLD AUTO: 0.3 %
EOSINOPHIL # BLD AUTO: 0.1 10^3/UL (ref 0–0.7)
EOSINOPHIL NFR BLD AUTO: 1 %
ERYTHROCYTE [DISTWIDTH] IN BLOOD BY AUTOMATED COUNT: 14.6 % (ref 12–15)
HCT VFR BLD AUTO: 38.3 % (ref 37–47)
HGB UR QL STRIP: 12.8 G/DL (ref 12–16)
LYMPHOCYTES # SPEC AUTO: 1.3 10^3/UL (ref 1.5–3.5)
LYMPHOCYTES NFR BLD AUTO: 9 %
MCH RBC QN AUTO: 31 PG (ref 27–31)
MCHC RBC AUTO-ENTMCNC: 33.4 G/DL (ref 32–36)
MCV RBC AUTO: 92.7 FL (ref 81–99)
MONOCYTES # BLD AUTO: 0.7 10^3/UL (ref 0–1)
MONOCYTES NFR BLD AUTO: 4.8 %
NEUTROPHILS # BLD AUTO: 12.4 10^3/UL (ref 1.5–6.6)
NEUTROPHILS # SNV AUTO: 14.6 X10^3/UL (ref 4.8–10.8)
NEUTROPHILS NFR BLD AUTO: 84.6 %
NRBC # BLD AUTO: 0 /100WBC
NRBC # BLD AUTO: 0 X10^3/UL
PDW BLD AUTO: 12.8 FL (ref 7.9–10.8)
PLATELET # BLD: 156 10^3/UL (ref 130–450)
RBC MAR: 4.13 10^6/UL (ref 4.2–5.4)

## 2021-12-22 PROCEDURE — 86900 BLOOD TYPING SEROLOGIC ABO: CPT

## 2021-12-22 PROCEDURE — 86850 RBC ANTIBODY SCREEN: CPT

## 2021-12-22 PROCEDURE — 85025 COMPLETE CBC W/AUTO DIFF WBC: CPT

## 2021-12-22 PROCEDURE — 86901 BLOOD TYPING SEROLOGIC RH(D): CPT

## 2021-12-22 PROCEDURE — 99215 OFFICE O/P EST HI 40 MIN: CPT

## 2021-12-22 RX ADMIN — DOCUSATE SODIUM PRN MG: 100 CAPSULE, LIQUID FILLED ORAL at 23:24

## 2021-12-22 RX ADMIN — ACETAMINOPHEN SCH MG: 500 TABLET ORAL at 23:23

## 2021-12-22 NOTE — DELIVERY NOTE
Delivery Note





- Labor


Labor: positive: Spontaneous





- Infant Delivery Method


Infant Delivery Method: positive: Spontaneous vaginal delivery





- Birth Presentation


Birth Presentation: positive: Vertex





- Nuchal Cord


Nuchal Cord: positive: None





- Anesthetic


Anesthetic Type: 





- Amniotic Fluid Description


Amniotic Fluid Description: positive: Clear





- Episiotomy Type


Episiotomy Type: positive: None





- Laceration


Laceration: positive: Labial (left)





- Delivery Outcome


Delivery Outcome: positive: Livebirth





- 


Clements: positive: Placed in direct skin contact with mother, [place] used


 sex: positive: Female





- Cord


Cord: positive: 3 vessels





- Placenta


Placenta: positive: Intact





- Estimated Blood Loss


Estimated Blood Loss (in cc): 200





- Post Delivery Events


Post Delivery Events: positive: No post delivery events





- Delivery Comments (Free Text/Narrative)


Delivery Comments (Free Text/Narrative): 


Preoperative Diagnoses


31-year-old G3, P2


37 weeks gestation


Term labor/SROM


IUGR





Postoperative Diagnoses


Same


Delivered





Delivery Summary:


Patient arrived by EMS and was checked in triage found to be complete and 

grossly ruptured.  Although she not feel the urge to push, she began pushing 

involuntarily and delivered precipitously in bed with the aid of the bedside 

nurse.  Upon maternal pushing the head was delivered atraumatically followed by 

the anterior shoulder, posterior shoulder, then the remainder of the infant's 

body.  A loose cord was wrapped around the shoulder.  A female infant was 

delivered with APGARS of 8 at 1 minute and 9 at 5 minutes.  The infant was 

placed on its mother's chest . After the cord finished pulsating, the umbilical 

cord was clamped times two and cut. 





10 units of oxytocin were given IM as she did not have an IV. The placenta 

delivered intact with three vessel cord. Placenta was sent to pathology.. 

Uterine massage was performed until uterus was deemed firm.





Upon inspection the perineum, a left labial laceration was noted and became 

hemostatic with pressure.  Upon re-inspection the patient was hemostatic.  

Uterus again massaged and found to be firm. Needle and sponge counts were 

correct.





Patient was stable and allowed to recover in L&D room.  Infant was stable and 

remained in room with mother.





Fetal weight is pending at this time.

## 2021-12-22 NOTE — HISTORY & PHYSICAL EXAMINATION
History and Physical





- History and Physical


HPI:


31-year-old G3 now P2 who presented at 37 weeks 3 days gestation who presented 

by EMS complaining of rupture of membranes.  Membranes ruptured shortly before 

arrival.  She was not complaining of pressure urgency, but after the bedside 

nurse checked and found to be complete, she began pushing involuntarily and 

delivered precipitously.  Please see delivery note for details.





All other symptoms reviewed and were negative except per HPI.





Prenatal Course


LMP 2021


HÉCTOR by 9.2wk U/S 01/10/2021 (c/w LMP dating)


FINAL HÉCTOR 2021 by LMP 


O pos/Rubella immune


VZV:immune


Genetic testing: Quad- NEGATIVE


FAS: WNL. Posterior placenta, no previa. Size c/w dating EFW 20%tile. 3VC.


2021 f/u Placenta posterior without previa. No change in nonspecific soft 

tissue lobulated focus within the aneteior uterine wall measuring 6.8 x 3.6 x 

4.1cm. EFW 5%tile.  -MFM consult initiated secondary to concern for IUGR.


Glucola 120


Influenza: 10/12/2021


TDAP: 10/12/2021


COVID-19 vaccine: 1st dose 21


GBS 2021 POSITIVE 


HSV: denies self and partner


Breast pump Rx 10/12


MOD: . : Meek. 7yo Mike, 2.4yo Luis Manuel. 


pp contraception:


pap: 10/2020-wnl per pt





PMH


Anxiety


Migraines


 


PSH


Unremarkable


 


OB History


G3, P3


1.  2013, 39 weeks, , preeclampsia, 7 pounds 3 ounces


2.  2018, 37 weeks, , SROM, 6 pounds


3.  Current pregnancy


 


SH


Denies tobacco, alcohol, drugs





Family History


Mother: Arthritis


Father: Drug abuse


Maternal grandfather: Lung cancer


Paternal grandmother: Colon cancer





Allergies


No known drug allergies





Medications


Prenatal vitamins


Citalopram


Magnesium


Low-dose aspirin





Physical exam:


Blood pressure 125/70, pulse 81, SPO2 100%


General: Alert, oriented, no acute distress


Head: Normal cephalic atraumatic


Eyes: PERRLA, extraocular motions intact.


Respiratory: Normal rate of respiration.  No accessory muscle use, normal 

respiratory effort.


Cardiovascular: Regular rate and rhythm


Abdomen: Soft, nontender


Extremities: Normal range of motion


Neuro: Oriented x3.  Normal movements


Psych: Appropriate mood and affect.  Normal judgment and insight





Plan


31-year-old  at 37 weeks 3 days gestation in term labor





1.  Term labor: Admit to L&D, admit labs


-Cervix found to be complete upon initial check with spontaneous rupture mem

branabby.  Patient precipitously delivered.  See delivery note for details.





2.  IUGR





3.  Anxiety


-Continue home Celexa

## 2021-12-23 RX ADMIN — IBUPROFEN SCH MG: 600 TABLET, FILM COATED ORAL at 11:26

## 2021-12-23 RX ADMIN — DOCUSATE SODIUM PRN MG: 100 CAPSULE, LIQUID FILLED ORAL at 21:40

## 2021-12-23 RX ADMIN — IBUPROFEN SCH MG: 600 TABLET, FILM COATED ORAL at 05:29

## 2021-12-23 RX ADMIN — ACETAMINOPHEN SCH MG: 500 TABLET ORAL at 17:02

## 2021-12-23 RX ADMIN — ACETAMINOPHEN SCH MG: 500 TABLET ORAL at 08:48

## 2021-12-23 RX ADMIN — DOCUSATE SODIUM PRN MG: 100 CAPSULE, LIQUID FILLED ORAL at 08:48

## 2021-12-23 RX ADMIN — IBUPROFEN SCH MG: 600 TABLET, FILM COATED ORAL at 17:03

## 2021-12-23 RX ADMIN — CITALOPRAM HYDROBROMIDE SCH MG: 10 TABLET ORAL at 08:51

## 2021-12-23 NOTE — PROVIDER PROGRESS NOTE
Subjective





- Prog Note Date


Prog Note Date: 12/23/21


Prog Note Time: 07:55





- Subjective


Pt reports feeling: Improved


Subjective: 





Subjective


Patient reports she is doing well.


Lochia appropriate. Denies heavy bleeding.


Ambulating.


Pelvic and abdominal pain well-controlled.


Tolerating oral intake. Diet: Regular.


Voiding without difficulty.


Passing flatus. Denies BM.


Patient is bonding with baby in room


Breast feeding going well.  Supplementing with formula


Denies feeling lightheaded, dizzy or excessively fatigued.





Objective





General:  Alert, oriented, no apparent distress.


Cardiovascular:   Regular rate.  Regular rhythm.


Lungs: No increased work of breathing.


Abdomen:  Uterus firm. Below umbilicus. 


Extremities:  Normal pedal pulses. No edema. 











Assessment and Plan


Postpartum day 1.


-Routine postpartum care


-Anticipate discharge tomorrow








Objective





- Vital Signs/Intake & Output


Vital Signs: 


                                Vital Signs x48h











  Temp Pulse Resp BP Pulse Ox


 


 12/23/21 05:44  98.1 F  85  17  129/91 H  100


 


 12/23/21 00:52  98.2 F  76  17  104/67  100











Intake & Output: 


                                 Intake & Output











 12/20/21 12/21/21 12/22/21 12/23/21





 23:59 23:59 23:59 23:59


 


Intake Total   429.65 440


 


Output Total   0 150


 


Balance   429.65 290














- Lab Results


Fish Bones: 


                                 12/22/21 23:04





Other Labs: 


                               Lab Results x24hrs











  12/22/21 12/22/21 Range/Units





  23:04 23:04 


 


WBC  14.6 H   (4.8-10.8)  x10^3/uL


 


RBC  4.13 L   (4.20-5.40)  10^6/uL


 


Hgb  12.8   (12.0-16.0)  g/dL


 


Hct  38.3   (37.0-47.0)  %


 


MCV  92.7   (81.0-99.0)  fL


 


MCH  31.0   (27.0-31.0)  pg


 


MCHC  33.4   (32.0-36.0)  g/dL


 


RDW  14.6   (12.0-15.0)  %


 


Plt Count  156   (130-450)  10^3/uL


 


MPV  12.8 H   (7.9-10.8)  fL


 


Neut # (Auto)  12.4 H   (1.5-6.6)  10^3/uL


 


Lymph # (Auto)  1.3 L   (1.5-3.5)  10^3/uL


 


Mono # (Auto)  0.7   (0.0-1.0)  10^3/uL


 


Eos # (Auto)  0.1   (0.0-0.7)  10^3/uL


 


Baso # (Auto)  0.1   (0.0-0.1)  10^3/uL


 


Absolute Nucleated RBC  0.00   x10^3/uL


 


Nucleated RBC %  0.0   /100WBC


 


Blood Type   O POSITIVE  


 


Antibody Screen   NEGATIVE

## 2021-12-23 NOTE — DISCHARGE PLAN
Discharge Plan


Problem Reviewed?: Yes


Disposition: 01 Home, Self Care


Condition: Good


Diet: Regular


Activity Restrictions: Additional Comments


Shower Restrictions: No


Driving Restrictions: No


Instruction Topics:  Birth Vaginal After


No Smoking: If you smoke, Please STOP!  Call [phone] for help.


Follow-up with: 


Nafisa Jeff MD [Provider Admit Priv/Credential] -

## 2021-12-24 VITALS — DIASTOLIC BLOOD PRESSURE: 74 MMHG | SYSTOLIC BLOOD PRESSURE: 126 MMHG

## 2021-12-24 RX ADMIN — IBUPROFEN SCH MG: 600 TABLET, FILM COATED ORAL at 00:36

## 2021-12-24 RX ADMIN — ACETAMINOPHEN SCH MG: 500 TABLET ORAL at 09:05

## 2021-12-24 RX ADMIN — IBUPROFEN SCH MG: 600 TABLET, FILM COATED ORAL at 09:05

## 2021-12-24 RX ADMIN — CITALOPRAM HYDROBROMIDE SCH MG: 10 TABLET ORAL at 09:04

## 2021-12-24 RX ADMIN — DOCUSATE SODIUM PRN MG: 100 CAPSULE, LIQUID FILLED ORAL at 09:05

## 2021-12-24 RX ADMIN — ACETAMINOPHEN SCH MG: 500 TABLET ORAL at 00:36

## 2021-12-24 NOTE — DISCHARGE SUMMARY
Discharge Summary


Admit Date: 21


Discharge Date: 21


Discharging Provider: Michael Young MD


Code Status: Attempt Resuscitation


Condition at Discharge: Good


Discharge Disposition: 01 Home, Self Care





- DIAGNOSES


Admission Diagnoses: 





Term labor


37 weeks gestation


IUGR


Anxiety


Discharge Diagnoses with Status of Each Condition: 


Term labor: Delivered


37 weeks gestation: Delivered


IUGR


Anxiety: Stable


Small for gestational age








- HPI


History of Present Illness: 





Subjective


Patient reports she is doing well.


Lochia appropriate. Denies heavy bleeding.


Ambulating.


Pelvic and abdominal pain well-controlled.


Tolerating oral intake. Diet: Regular.


Voiding without difficulty.


Passing flatus. .


Patient is bonding with baby in room


Breast feeding going well.  


Denies feeling lightheaded, dizzy or excessively fatigued.





Objective





General:  Alert, oriented, no apparent distress.


Cardiovascular:   Regular rate.  Regular rhythm.


Lungs: No increased work of breathing.


Abdomen:  Uterus firm. Below umbilicus.  No guarding or rebound.





- HOSPITAL COURSE


Hospital Course: 





Patient was a 31-year-old G3, P2 at 37 weeks 3 days gestation who presented by 

EMS with rupture of membranes and delivered shortly upon arrival to the 

hospital.  Delivery was precipitous and uncomplicated.  Postpartum course was 

unremarkable although her  was known to be IUGR and was small for 

gestational age at birth.  Postpartum course was uncomplicated and they were 

discharged on postpartum day 2.





- ALLERGIES


Allergies/Adverse Reactions: 


                                    Allergies











Allergy/AdvReac Type Severity Reaction Status Date / Time


 


No Known Drug Allergies Allergy   Verified 21 19:56














- MEDICATIONS


Home Medications: 


                                Ambulatory Orders











 Medication  Instructions  Recorded  Confirmed


 


metroNIDAZOLE [Flagyl] 500 mg PO BID 7 Days #14 tablet 21 


 


Acetaminophen [Acetaminophen Extra 1,000 mg PO Q8H PRN #60 tablet 21 





Strength]   


 


Docusate Sodium 100Mg Capsule 100 - 200 mg PO BID PRN #60 cap 21 





[Colace 100Mg Capsule]   


 


Ibuprofen [Motrin] 600 mg PO Q6H PRN #30 tab 21 














- LABS


Result Diagrams: 


                                 21 23:04








- FOLLOW UP


Follow Up: 


Follow-up in 1 week with Nafisa Jeff MD








- TIME SPENT


Time Spent in Discharge (Minutes): 20 No complaints

## 2021-12-24 NOTE — LABOR FLOWSHEET
===================================

Labor Flowsheet

===================================

Datetime Report Generated by CPN: 12/24/2021 10:34

   

   

===========================

Datetime: 12/24/2021 08:24

===========================

   

   

===================================

VITAL SIGNS

===================================

   

 NBP Sys/Randi/Mean (mmHg):  126

:  74

:  87

 Pulse:  97

   

===========================

Datetime: 12/23/2021 21:30

===========================

   

 SpO2 (%):  99

   

===========================

Datetime: 12/22/2021 23:32

===========================

   

 Respirations:  16

   

===========================

Datetime: 12/22/2021 23:06

===========================

   

   

===================================

PAIN

===================================

   

 Pain Relief Measures:  Pain Medication Given

   

===========================

Datetime: 12/22/2021 22:16

===========================

   

 Temperature (C):  36.6

 Temperature Route:  Oral

   

===========================

Datetime: 12/22/2021 22:15

===========================

   

Stage of Pregnancy:  Recovery

   

===========================

Datetime: 12/22/2021 22:10

===========================

   

   

===================================

COMMUNICATION

===================================

   

 LaborFlag:  OB Triage